# Patient Record
Sex: FEMALE | Race: BLACK OR AFRICAN AMERICAN
[De-identification: names, ages, dates, MRNs, and addresses within clinical notes are randomized per-mention and may not be internally consistent; named-entity substitution may affect disease eponyms.]

---

## 2017-01-13 ENCOUNTER — HOSPITAL ENCOUNTER (EMERGENCY)
Dept: HOSPITAL 74 - JER | Age: 72
Discharge: HOME | End: 2017-01-13
Payer: COMMERCIAL

## 2017-01-13 VITALS — HEART RATE: 88 BPM | DIASTOLIC BLOOD PRESSURE: 68 MMHG | SYSTOLIC BLOOD PRESSURE: 154 MMHG

## 2017-01-13 VITALS — BODY MASS INDEX: 25.7 KG/M2

## 2017-01-13 VITALS — TEMPERATURE: 98 F

## 2017-01-13 DIAGNOSIS — R53.1: Primary | ICD-10-CM

## 2017-01-13 DIAGNOSIS — K21.9: ICD-10-CM

## 2017-01-13 DIAGNOSIS — I10: ICD-10-CM

## 2017-01-13 LAB
ALBUMIN SERPL-MCNC: 4 G/DL (ref 3.4–5)
ALP SERPL-CCNC: 73 U/L (ref 45–117)
ALT SERPL-CCNC: 29 U/L (ref 12–78)
ANION GAP SERPL CALC-SCNC: 10 MMOL/L (ref 8–16)
AST SERPL-CCNC: 19 U/L (ref 15–37)
BASOPHILS # BLD: 1.3 % (ref 0–2)
BILIRUB SERPL-MCNC: 0.2 MG/DL (ref 0.2–1)
CALCIUM SERPL-MCNC: 9 MG/DL (ref 8.5–10.1)
CO2 SERPL-SCNC: 26 MMOL/L (ref 21–32)
CREAT SERPL-MCNC: 1 MG/DL (ref 0.55–1.02)
DEPRECATED RDW RBC AUTO: 13.9 % (ref 11.6–15.6)
EOSINOPHIL # BLD: 0.1 % (ref 0–4.5)
GLUCOSE SERPL-MCNC: 175 MG/DL (ref 74–106)
MCH RBC QN AUTO: 28.6 PG (ref 25.7–33.7)
MCHC RBC AUTO-ENTMCNC: 32.5 G/DL (ref 32–36)
MCV RBC: 88.2 FL (ref 80–96)
NEUTROPHILS # BLD: 80.2 % (ref 42.8–82.8)
PLATELET # BLD AUTO: 206 K/MM3 (ref 134–434)
PMV BLD: 10.2 FL (ref 7.5–11.1)
PROT SERPL-MCNC: 7.1 G/DL (ref 6.4–8.2)
TROPONIN I SERPL-MCNC: < 0.02 NG/ML (ref 0–0.05)
WBC # BLD AUTO: 5.3 K/MM3 (ref 4–10)

## 2017-01-13 NOTE — PDOC
History of Present Illness





- General


Chief Complaint: CVA/TIA


Stated Complaint: LT SIDE WEAKNESS


Time Seen by Provider: 01/13/17 13:22


History Source: Patient





- History of Present Illness


Timing/Duration: reports: 4-6 hours


Associated Symptoms: reports: weakness.  denies: nausea/vomiting, numbness in 

legs/feet, slurred speech, vision changes





Past History





- Past Medical History


Allergies/Adverse Reactions: 


 Allergies











Allergy/AdvReac Type Severity Reaction Status Date / Time


 


No Known Allergies Allergy   Verified 01/13/17 13:14











Home Medications: 


Ambulatory Orders





Esomeprazole Magnesium [Nexium 24Hr] 20 mg PO DAILY 01/13/17 


Nebivolol HCl [Bystolic] 5 mg PO DAILY 01/13/17 


Ranitidine HCl [Zantac] 150 mg PO DAILY 01/13/17 








GI Disorders: Yes (gerd)


HTN: Yes





- Psycho/Social/Smoking Cessation Hx


Anxiety: No


Suicidal Ideation: No


Smoking History: Never smoked


Have you smoked in the past 12 months: No


Information on smoking cessation initiated: No


Hx Alcohol Use: No


Drug/Substance Use Hx: No


Substance Use Type: None





**Review of Systems





- Review of Systems


Constitutional: No: Chills, Fever, Malaise


HEENTM: No: Blurred Vision


Respiratory: No: Shortness of Breath


Cardiac (ROS): No: Chest Pain


ABD/GI: No: Nausea, Vomiting


Neurological: Yes: Tingling.  No: Headache, Numbness, Dizziness





*Physical Exam





- Vital Signs


 Last Vital Signs











Temp Pulse Resp BP Pulse Ox


 


 98.0 F   100 H  18   164/89   100 


 


 01/13/17 13:16  01/13/17 13:16  01/13/17 13:16  01/13/17 13:16  01/13/17 13:16














- Physical Exam


General Appearance: Yes: Appropriately Dressed.  No: Apparent Distress


HEENT: positive: Normal Voice


Neck: positive: Supple


Respiratory/Chest: positive: Lungs Clear, Normal Breath Sounds.  negative: 

Respiratory Distress


Cardiovascular: positive: Regular Rate, S1, S2


Gastrointestinal/Abdominal: positive: Soft


Integumentary: positive: Dry, Warm


Neurologic: positive: Fully Oriented, Alert, Normal Mood/Affect, Motor Strength 

5/5, Finger to Nose (no nystagmus, Georges intact, no drift, no ataxia).  negative

: Facial Droop





**Heart Score/ECG Review





- ECG Intrepretation


Comment:: 





01/13/17 14:28


Twelve-lead EKG was performed and reviewed by me. There is normal sinus rhythm 

with a normal rate. The axis is normal. The intervals are normal. There are no 

ST or T wave abnormalities.





Impression: Normal twelve-lead EKG





Critical Care Time/MDM Note





- Medical Decision Making


Note: 


01/13/17 13:46


72 yo F, h/o HTN, p/w L sides weakness/tingling.  Patient reports that ~ 9am 

today, she started feeling hot and then cold.  At some point, felt some 

tingling and weakness to left arm and left leg that has since resolved.  

Patient denies headache, slurred speech, dizziness, vertigo or visual changes.  

Patient states she has had similar symptoms multiple times in the past and has 

been worked up for stroke with negative findings.  Patient denies any chest 

pain or shortness of breath at this time.





See exam





Recurrent L sided weakness this am, since resolved


Neg stroke w/u in past per pt


Stable and non-focal in ED


-CT H and labs in progress


-will discuss dispo w/ PMD





01/13/17 13:50








01/13/17 14:11








01/13/17 14:43


Case d/w Dr Mendes, rec neuro be consulted for recommendations. States if 

pt admitted, to go to Dr Martin





01/13/17 14:52








01/13/17 16:20


Case d/w Dr Bailey who states of w/u including CT neg and pt baseline, can be 

discharged and f/u with him as outpt.





01/13/17 16:22


CTH and labs neg. Pt remains stable and symptomatic in ED. Feels safe going 

home and will f/u with neuro. Strict return precautions given











01/13/17 16:23








**Discharge Disposition





- Diagnosis


 Weakness





- Discharge Dispostion


Disposition: HOME


Condition at time of disposition: Improved





- Referrals


Referrals: 


Paramjit Mendes MD [Primary Care Provider] - 


Jr Bailey MD [Staff Physician] - 





- Patient Instructions


Additional Instructions: 


Please return to ED for worsening of symptoms. Otherwise follow with Dr Bailey 

of neurology

## 2017-01-14 NOTE — EKG
Test Reason : 

Blood Pressure : ***/*** mmHG

Vent. Rate : 085 BPM     Atrial Rate : 085 BPM

   P-R Int : 172 ms          QRS Dur : 072 ms

    QT Int : 350 ms       P-R-T Axes : 042 -10 009 degrees

   QTc Int : 416 ms

 

NORMAL SINUS RHYTHM

MODERATE VOLTAGE CRITERIA FOR LVH, MAY BE NORMAL VARIANT

BORDERLINE ECG

WHEN COMPARED WITH ECG OF 23-FEB-2013 10:12,

NO SIGNIFICANT CHANGE WAS FOUND

Confirmed by STEPHENIE ALDRIDGE, WARREN ([id number]) on 1/14/2017 4:28:59 PM

 

Referred By:             Confirmed By:WARREN CASIANO MD

## 2018-03-06 ENCOUNTER — HOSPITAL ENCOUNTER (EMERGENCY)
Dept: HOSPITAL 74 - JER | Age: 73
Discharge: HOME | End: 2018-03-06
Payer: COMMERCIAL

## 2018-03-06 VITALS — BODY MASS INDEX: 24.6 KG/M2

## 2018-03-06 VITALS — HEART RATE: 95 BPM | TEMPERATURE: 98 F

## 2018-03-06 VITALS — DIASTOLIC BLOOD PRESSURE: 69 MMHG | SYSTOLIC BLOOD PRESSURE: 159 MMHG

## 2018-03-06 DIAGNOSIS — I10: Primary | ICD-10-CM

## 2018-03-06 DIAGNOSIS — K21.9: ICD-10-CM

## 2018-03-06 NOTE — PDOC
History of Present Illness





- General


Chief Complaint: Blood Pressure Problem


Stated Complaint: BLOOD PRESSURE


Time Seen by Provider: 03/06/18 19:36





- History of Present Illness


Initial Comments: 





03/06/18 20:49


Ms. Sweet is a 71 yo female w/ pmh of HTN who presents complaining of a 1 day 

history of neck/head pain with noted high blood pressure at home. She reports 

she had seen her PCP (Dr. Mendes) yesterday and was transitioned from 1 

methoprolol 25mg /day to 2 per day plus 1 amlodipine 5mg. She took her own BP 

this morning however after she experienced the headache and found it to be 179 

systolic.





Past History





- Past Medical History


Allergies/Adverse Reactions: 


 Allergies











Allergy/AdvReac Type Severity Reaction Status Date / Time


 


No Known Allergies Allergy   Verified 03/06/18 19:40











Home Medications: 


Ambulatory Orders





Esomeprazole Magnesium [Nexium 24Hr] 20 mg PO DAILY 01/13/17 


Ranitidine HCl [Zantac] 150 mg PO DAILY 01/13/17 


Amlodipine Besylate 5 mg PO DAILY 03/06/18 


Metoprolol Tartrate 50 mg PO DAILY 03/06/18 








COPD: No


GI Disorders: Yes (gerd)


HTN: Yes





- Suicide/Smoking/Psychosocial Hx


Smoking History: Never smoked


Have you smoked in the past 12 months: No


Information on smoking cessation initiated: No


Hx Alcohol Use: No


Drug/Substance Use Hx: No


Substance Use Type: None





**Review of Systems





- Review of Systems


Comments:: 





03/06/18 21:50


GENERAL/CONSTITUTIONAL: No fever or chills. No weakness.


HEAD, EYES, EARS, NOSE AND THROAT: No change in vision. No ear pain or 

discharge. No sore throat.


CARDIOVASCULAR: No chest pain or shortness of breath


RESPIRATORY: No cough, wheezing, or hemoptysis.


GASTROINTESTINAL: No nausea, vomiting, diarrhea or constipation.


GENITOURINARY: No dysuria, frequency, or change in urination.


MUSCULOSKELETAL: No joint or muscle swelling or pain. No neck or back pain.


SKIN: No rash


NEUROLOGIC: +Mild headache as described. No vertigo, loss of consciousness, or 

change in strength/sensation.


ENDOCRINE: No increased thirst. No abnormal weight change


HEMATOLOGIC/LYMPHATIC: No anemia, easy bleeding, or history of blood clots.


ALLERGIC/IMMUNOLOGIC: No hives or skin allergy.





*Physical Exam





- Vital Signs


 Last Vital Signs











Temp Pulse Resp BP Pulse Ox


 


 98.0 F   95 H  20   153/99   100 


 


 03/06/18 19:40  03/06/18 19:40  03/06/18 19:40  03/06/18 19:40  03/06/18 19:40














- Physical Exam


Comments: 





03/06/18 21:51


GENERAL: Awake, alert, and fully oriented, in no acute distress


HEAD: No signs of trauma, normocephalic, atraumatic 


EYES: PERRLA, EOMI, sclera anicteric, conjunctiva clear


ENT: Auricles normal inspection, hearing grossly normal, nares patent, 

oropharynx clear without


exudates. Moist mucosa


NECK: Normal ROM, supple, no lymphadenopathy, JVD, or masses


LUNGS: No distress, speaks full sentences, clear to auscultation bilaterally 


HEART: Regular rate and rhythm, normal S1 and S2, no murmurs, rubs or gallops, 

peripheral pulses normal and equal bilaterally. 


ABDOMEN: Soft, nontender, normoactive bowel sounds. No guarding, no rebound. No 

masses


EXTREMITIES: Normal inspection, Normal range of motion, no edema. No clubbing 

or cyanosis. 


NEUROLOGICAL: Cranial nerves II through XII grossly intact. Normal speech, 

normal gait, no focal sensorimotor deficits 


SKIN: Warm, Dry, normal turgor, no rashes or lesions noted. 





Medical Decision Making





- Medical Decision Making





03/06/18 21:51


Ms. Sweet is a 71 yo female w/ pmh of HTN who presents for evaluation of high 

blood pressure after systolic 180's at home today. Patient currently 

asymptomatic, reporting headache she says is consistent with normal headaches 

she experiences. She does not want anything for pain and is interacting at 

baseline per  who is with her.





03/06/18 22:01


Repeat blood pressure decreased from 180's on presentation systolic to 159/69 

after 5mg amlodipine (patient's home dose again). Patient will follow-up with 

PCP in morning after taking home BP medications. Patient verbalized 

understanding and agreement. Discharging patient to home.





*DC/Admit/Observation/Transfer


Diagnosis at time of Disposition: 


Hypertension


Qualifiers:


 Hypertension type: unspecified Qualified Code(s): I10 - Essential (primary) 

hypertension








- Discharge Dispostion


Disposition: HOME





- Referrals


Referrals: 


Paramjit Mendes MD [Primary Care Provider] - 





- Patient Instructions


Printed Discharge Instructions:  DI for High Blood Pressure


Additional Instructions: 


Please return if any pain, fever, or other concerning symptoms. Follow-up with 

Dr. Mendes tomorrow as discussed.





- Post Discharge Activity

## 2018-03-06 NOTE — PDOC
Rapid Medical Evaluation


Time Seen by Provider: 03/06/18 19:36


Medical Evaluation: 


 Allergies











Allergy/AdvReac Type Severity Reaction Status Date / Time


 


No Known Allergies Allergy   Verified 01/13/17 13:14











03/06/18 19:36


The patient presents with a chief complaint of: [High Blood Pressure, headache, 

pain in the back of the neck.  No CP or SOB.     ] 


 I have performed a brief in-person evaluation of this patient.


 Pertinent physical exam findings: 153/99, neuro intact, RRR, Lungs clear. 


 I have ordered the following:  [None


 The patient will proceed to the ED for further evaluation.





**Discharge Disposition





- Diagnosis


 Hypertension








- Referrals





- Patient Instructions





- Post Discharge Activity

## 2022-03-14 ENCOUNTER — HOSPITAL ENCOUNTER (OUTPATIENT)
Dept: HOSPITAL 74 - JASU-ENDO | Age: 77
Discharge: HOME | End: 2022-03-14
Attending: INTERNAL MEDICINE
Payer: COMMERCIAL

## 2022-03-14 VITALS — DIASTOLIC BLOOD PRESSURE: 67 MMHG | SYSTOLIC BLOOD PRESSURE: 131 MMHG | HEART RATE: 75 BPM | TEMPERATURE: 98.1 F

## 2022-03-14 VITALS — BODY MASS INDEX: 25 KG/M2

## 2022-03-14 DIAGNOSIS — K57.30: ICD-10-CM

## 2022-03-14 DIAGNOSIS — K64.8: ICD-10-CM

## 2022-03-14 DIAGNOSIS — Z86.010: ICD-10-CM

## 2022-03-14 DIAGNOSIS — Z12.11: Primary | ICD-10-CM

## 2022-03-14 DIAGNOSIS — D12.2: ICD-10-CM

## 2022-03-14 PROCEDURE — 0DBK8ZX EXCISION OF ASCENDING COLON, VIA NATURAL OR ARTIFICIAL OPENING ENDOSCOPIC, DIAGNOSTIC: ICD-10-PCS | Performed by: INTERNAL MEDICINE
